# Patient Record
Sex: FEMALE | Race: WHITE | URBAN - METROPOLITAN AREA
[De-identification: names, ages, dates, MRNs, and addresses within clinical notes are randomized per-mention and may not be internally consistent; named-entity substitution may affect disease eponyms.]

---

## 2017-10-24 ENCOUNTER — HOSPITAL ENCOUNTER (OUTPATIENT)
Dept: ENDOSCOPY | Age: 67
Discharge: HOME | End: 2017-10-24
Attending: INTERNAL MEDICINE | Admitting: INTERNAL MEDICINE

## 2017-10-24 VITALS
RESPIRATION RATE: 16 BRPM | HEIGHT: 62 IN | OXYGEN SATURATION: 99 % | HEART RATE: 60 BPM | DIASTOLIC BLOOD PRESSURE: 80 MMHG | BODY MASS INDEX: 40.16 KG/M2 | WEIGHT: 218.25 LBS | TEMPERATURE: 97.9 F | SYSTOLIC BLOOD PRESSURE: 133 MMHG

## 2017-10-24 RX ORDER — SODIUM CHLORIDE 9 MG/ML
INJECTION, SOLUTION INTRAVENOUS CONTINUOUS
Status: DISCONTINUED | OUTPATIENT
Start: 2017-10-24 | End: 2017-10-25 | Stop reason: HOSPADM

## 2017-10-24 RX ORDER — OXYCODONE HYDROCHLORIDE AND ACETAMINOPHEN 5; 325 MG/1; MG/1
1 TABLET ORAL PRN
Status: ACTIVE | OUTPATIENT
Start: 2017-10-24 | End: 2017-10-24

## 2017-10-24 RX ORDER — OXYCODONE HYDROCHLORIDE AND ACETAMINOPHEN 5; 325 MG/1; MG/1
2 TABLET ORAL PRN
Status: ACTIVE | OUTPATIENT
Start: 2017-10-24 | End: 2017-10-24

## 2017-10-24 RX ORDER — MEPERIDINE HYDROCHLORIDE 25 MG/ML
12.5 INJECTION INTRAMUSCULAR; INTRAVENOUS; SUBCUTANEOUS EVERY 5 MIN PRN
Status: DISCONTINUED | OUTPATIENT
Start: 2017-10-24 | End: 2017-10-25 | Stop reason: HOSPADM

## 2017-10-24 RX ORDER — MORPHINE SULFATE 4 MG/ML
2 INJECTION, SOLUTION INTRAMUSCULAR; INTRAVENOUS EVERY 5 MIN PRN
Status: DISCONTINUED | OUTPATIENT
Start: 2017-10-24 | End: 2017-10-25 | Stop reason: HOSPADM

## 2017-10-24 RX ORDER — SODIUM CHLORIDE 0.9 % (FLUSH) 0.9 %
10 SYRINGE (ML) INJECTION PRN
Status: DISCONTINUED | OUTPATIENT
Start: 2017-10-24 | End: 2017-10-25 | Stop reason: HOSPADM

## 2017-10-24 RX ORDER — SODIUM CHLORIDE 0.9 % (FLUSH) 0.9 %
10 SYRINGE (ML) INJECTION EVERY 12 HOURS SCHEDULED
Status: DISCONTINUED | OUTPATIENT
Start: 2017-10-24 | End: 2017-10-25 | Stop reason: HOSPADM

## 2017-10-24 RX ORDER — ONDANSETRON 2 MG/ML
4 INJECTION INTRAMUSCULAR; INTRAVENOUS
Status: ACTIVE | OUTPATIENT
Start: 2017-10-24 | End: 2017-10-24

## 2017-10-24 RX ORDER — MORPHINE SULFATE 4 MG/ML
1 INJECTION, SOLUTION INTRAMUSCULAR; INTRAVENOUS EVERY 5 MIN PRN
Status: DISCONTINUED | OUTPATIENT
Start: 2017-10-24 | End: 2017-10-25 | Stop reason: HOSPADM

## 2017-10-24 RX ORDER — FENTANYL CITRATE 50 UG/ML
50 INJECTION, SOLUTION INTRAMUSCULAR; INTRAVENOUS EVERY 5 MIN PRN
Status: DISCONTINUED | OUTPATIENT
Start: 2017-10-24 | End: 2017-10-25 | Stop reason: HOSPADM

## 2017-10-24 RX ORDER — FENTANYL CITRATE 50 UG/ML
25 INJECTION, SOLUTION INTRAMUSCULAR; INTRAVENOUS EVERY 5 MIN PRN
Status: DISCONTINUED | OUTPATIENT
Start: 2017-10-24 | End: 2017-10-25 | Stop reason: HOSPADM

## 2017-10-24 RX ADMIN — SODIUM CHLORIDE: 9 INJECTION, SOLUTION INTRAVENOUS at 10:15

## 2017-10-24 ASSESSMENT — LIFESTYLE VARIABLES: SMOKING_STATUS: 0

## 2017-10-24 ASSESSMENT — PAIN SCALES - GENERAL
PAINLEVEL_OUTOF10: 0

## 2017-10-24 ASSESSMENT — PAIN - FUNCTIONAL ASSESSMENT: PAIN_FUNCTIONAL_ASSESSMENT: 0-10

## 2017-10-24 NOTE — H&P
Pre-operative History and Physical    Patient: Saroj López  : 1950  Acct#:     Intended Procedure:  Colonoscopy    HISTORY OF PRESENT ILLNESS:  The patient is a 69 y.o. female  who presents for/due to  screening for colorectal cancer. Past Medical History:        Diagnosis Date    Arthritis     Asthma     Hyperlipidemia     Hypertension      Past Surgical History:        Procedure Laterality Date    APPENDECTOMY      COLONOSCOPY  10/21/2014    polyps     Medications Prior to Admission:   Current Outpatient Prescriptions on File Prior to Encounter   Medication Sig Dispense Refill    BLACK COHOSH COMPOUND PO Take by mouth      Misc Natural Products (GLUCOSAMINE CHOND COMPLEX/MSM PO) Take by mouth      levocetirizine (XYZAL) 5 MG tablet Take 5 mg by mouth nightly      Triamcinolone Acetonide (NASACORT AQ NA) 2 sprays by Nasal route daily.  ALBUTEROL SULFATE IN Inhale  into the lungs.  fluticasone (FLOVENT HFA) 110 MCG/ACT inhaler Inhale 1 puff into the lungs 2 times daily.  hydrochlorothiazide (HYDRODIURIL) 25 MG tablet Take 25 mg by mouth daily.  atorvastatin (LIPITOR) 10 MG tablet Take 10 mg by mouth daily.  Fesoterodine Fumarate ER (TOVIAZ) 8 MG TB24 Take  by mouth.  Multiple Vitamins-Minerals (THERAPEUTIC MULTIVITAMIN-MINERALS) tablet Take 1 tablet by mouth daily.  Vitamin D (CHOLECALCIFEROL) 1000 UNITS CAPS capsule Take 1,000 Units by mouth daily.  Nutritional Supplements (MENOPAUSE FORMULA PO) Take  by mouth.  COD LIVER OIL PO Take  by mouth.  vitamin E 400 UNIT capsule Take 400 Units by mouth daily.  Cholecalciferol (VITAMIN D3) 71857 UNITS CAPS Take  by mouth.  Psyllium (METAMUCIL PO) Take  by mouth. No current facility-administered medications on file prior to encounter. Allergies:  Aleve [naproxen sodium]; Jacob Hoyos [aspirin];  Keflex [cephalexin]; and Sulfacetamide    Social History:   TOBACCO:   reports that she has quit smoking. She has never used smokeless tobacco.  ETOH:   reports that she does not drink alcohol. DRUGS:   has no drug history on file. PHYSICAL EXAM:      Vital Signs: BP (!) 149/62   Pulse 57   Temp 97.9 °F (36.6 °C) (Temporal)   Resp 16   Ht 5' 1.5\" (1.562 m)   Wt 218 lb 4 oz (99 kg)   LMP  (LMP Unknown)   SpO2 95%   BMI 40.57 kg/m²    Airway: No stridor or wheezing noted. Good air movement  Pulmonary: without wheezes. Clear to auscultation  Cardiac:regular rate and rhythm without loud murmurs  Abdomen:soft, nontender,  Bowel sounds present    Pre-Procedure Assessment / Plan:  1) Colonoscopy -  screening for colorectal cancer. ASA Grade:  ASA 3 - Patient with moderate systemic disease with functional limitations  Mallampati Classification:  Class II    Level of Sedation Plan: Moderate sedation    Post Procedure plan: Return to same level of care    I assessed the patient and find that the patient is in satisfactory condition to proceed with the planned procedure and sedation plan. I have explained the risk, benefits, and alternatives to the procedure; the patient understands and agrees to proceed.        Liz Edmonds MD  10/24/2017

## 2017-10-24 NOTE — ANESTHESIA PRE-OP
Take  by mouth.  COD LIVER OIL PO Take  by mouth.  vitamin E 400 UNIT capsule Take 400 Units by mouth daily.  Cholecalciferol (VITAMIN D3) 76533 UNITS CAPS Take  by mouth.  Psyllium (METAMUCIL PO) Take  by mouth. No current facility-administered medications on file prior to encounter. Current Outpatient Prescriptions   Medication Sig Dispense Refill    BLACK COHOSH COMPOUND PO Take by mouth      Misc Natural Products (GLUCOSAMINE CHOND COMPLEX/MSM PO) Take by mouth      levocetirizine (XYZAL) 5 MG tablet Take 5 mg by mouth nightly      Triamcinolone Acetonide (NASACORT AQ NA) 2 sprays by Nasal route daily.  ALBUTEROL SULFATE IN Inhale  into the lungs.  fluticasone (FLOVENT HFA) 110 MCG/ACT inhaler Inhale 1 puff into the lungs 2 times daily.  hydrochlorothiazide (HYDRODIURIL) 25 MG tablet Take 25 mg by mouth daily.  atorvastatin (LIPITOR) 10 MG tablet Take 10 mg by mouth daily.  Fesoterodine Fumarate ER (TOVIAZ) 8 MG TB24 Take  by mouth.  Multiple Vitamins-Minerals (THERAPEUTIC MULTIVITAMIN-MINERALS) tablet Take 1 tablet by mouth daily.  Vitamin D (CHOLECALCIFEROL) 1000 UNITS CAPS capsule Take 1,000 Units by mouth daily.  Nutritional Supplements (MENOPAUSE FORMULA PO) Take  by mouth.  COD LIVER OIL PO Take  by mouth.  vitamin E 400 UNIT capsule Take 400 Units by mouth daily.  Cholecalciferol (VITAMIN D3) 95070 UNITS CAPS Take  by mouth.  Psyllium (METAMUCIL PO) Take  by mouth. No current facility-administered medications for this encounter. Vital Signs (Current) There were no vitals filed for this visit. Vital Signs Statistics (for past 48 hrs)     No Data Recorded    BP Readings from Last 3 Encounters:   09/10/15 151/85   10/21/14 129/58     BMI  There is no height or weight on file to calculate BMI.   Estimated body mass index is 39.04 kg/m² as calculated from the following:    Height as of 10/17/17: 5' 1.5\" Provider, MD   atorvastatin (LIPITOR) 10 MG tablet Take 10 mg by mouth daily. Historical Provider, MD   Fesoterodine Fumarate ER (TOVIAZ) 8 MG TB24 Take  by mouth. Historical Provider, MD   Multiple Vitamins-Minerals (THERAPEUTIC MULTIVITAMIN-MINERALS) tablet Take 1 tablet by mouth daily. Historical Provider, MD   Vitamin D (CHOLECALCIFEROL) 1000 UNITS CAPS capsule Take 1,000 Units by mouth daily. Historical Provider, MD   Nutritional Supplements (MENOPAUSE FORMULA PO) Take  by mouth. Historical Provider, MD   COD LIVER OIL PO Take  by mouth. Historical Provider, MD   vitamin E 400 UNIT capsule Take 400 Units by mouth daily. Historical Provider, MD   Cholecalciferol (VITAMIN D3) 07932 UNITS CAPS Take  by mouth. Historical Provider, MD   Psyllium (METAMUCIL PO) Take  by mouth. Historical Provider, MD       Current medications:    Current Outpatient Prescriptions   Medication Sig Dispense Refill    BLACK COHOSH COMPOUND PO Take by mouth      Misc Natural Products (GLUCOSAMINE CHOND COMPLEX/MSM PO) Take by mouth      levocetirizine (XYZAL) 5 MG tablet Take 5 mg by mouth nightly      Triamcinolone Acetonide (NASACORT AQ NA) 2 sprays by Nasal route daily.  ALBUTEROL SULFATE IN Inhale  into the lungs.  fluticasone (FLOVENT HFA) 110 MCG/ACT inhaler Inhale 1 puff into the lungs 2 times daily.  hydrochlorothiazide (HYDRODIURIL) 25 MG tablet Take 25 mg by mouth daily.  atorvastatin (LIPITOR) 10 MG tablet Take 10 mg by mouth daily.  Fesoterodine Fumarate ER (TOVIAZ) 8 MG TB24 Take  by mouth.  Multiple Vitamins-Minerals (THERAPEUTIC MULTIVITAMIN-MINERALS) tablet Take 1 tablet by mouth daily.  Vitamin D (CHOLECALCIFEROL) 1000 UNITS CAPS capsule Take 1,000 Units by mouth daily.  Nutritional Supplements (MENOPAUSE FORMULA PO) Take  by mouth.  COD LIVER OIL PO Take  by mouth.  vitamin E 400 UNIT capsule Take 400 Units by mouth daily.      

## 2017-10-24 NOTE — OP NOTE
Colonoscopy Procedure Note      Patient: Hallie Crocker  : 1950  Acct#:     Procedure: Colonoscopy, diagnostic    Date:  10/24/2017    Surgeon:  Chyna Neely MD    Referring Physician:  Yelena Borjas    Previous Colonoscopy: YES  Date: unknown  Greater than 3 years: YES    Preoperative Diagnosis:  70 yo woman here for  screening for colorectal cancer. Postoperative Diagnosis:    1) Normal colonoscopy. Consent:  The patient or their legal guardian has signed a consent, and is aware of the potential risks, benefits, alternatives, and potential complications of this procedure. These include, but are not limited to hemorrhage, bleeding, post procedural pain, perforation, phlebitis, aspiration, hypotension, hypoxia, cardiovascular events such as arryhthmia, and possibly death. Additionally, the possibility of missed colonic polyps and interval colon cancer was discussed in the consent. Anesthesia:  Administered by monitored anesthesia care. Procedure: An informed consent was obtained from the patient after explanation of indications, benefits, possible risks and complications of the procedure. The patient was then taken to the endoscopy suite, placed in the left lateral decubitus position, and the above IV anesthesia was administered. A digital rectal examination was performed and revealed negative without mass, lesions or tenderness. The Olympus video colonoscope was placed in the patient's rectum under digital direction and advanced to the cecum. The cecum was identified by characteristic anatomy and ballottment. The prep was good. The ileocecal valve was identified. The terminal ileum was not intubated. The scope was then withdrawn back through the cecum, ascending, transverse, descending, sigmoid colon, and rectum. Careful circumferential examination of the mucosa in these areas demonstrated no abnormalities.   The scope was then withdrawn into the rectum and retroflexed. The retroflexed view of the anal verge and rectum demonstrates no abnormalities. The scope was straightened, the colon was decompressed and the scope was withdrawn from the patient. The patient tolerated the procedure well and was taken to the PACU in good condition. Estimated blood loss: None    Impression:    1) Normal colonoscopy. Recommendations:  1) Repeat colonoscopy in 10 years.     Son Coker, [address] [facility]  10/24/2017  [phone]